# Patient Record
Sex: FEMALE | Race: WHITE | Employment: FULL TIME | URBAN - METROPOLITAN AREA
[De-identification: names, ages, dates, MRNs, and addresses within clinical notes are randomized per-mention and may not be internally consistent; named-entity substitution may affect disease eponyms.]

---

## 2018-11-01 ENCOUNTER — OFFICE VISIT (OUTPATIENT)
Dept: FAMILY MEDICINE CLINIC | Age: 58
End: 2018-11-01

## 2018-11-01 VITALS
WEIGHT: 151 LBS | RESPIRATION RATE: 16 BRPM | OXYGEN SATURATION: 99 % | BODY MASS INDEX: 25.78 KG/M2 | HEIGHT: 64 IN | TEMPERATURE: 96.2 F | SYSTOLIC BLOOD PRESSURE: 125 MMHG | DIASTOLIC BLOOD PRESSURE: 67 MMHG | HEART RATE: 70 BPM

## 2018-11-01 DIAGNOSIS — E04.1 RIGHT THYROID NODULE: Primary | ICD-10-CM

## 2018-11-01 RX ORDER — OMEPRAZOLE 40 MG/1
40 CAPSULE, DELAYED RELEASE ORAL DAILY
COMMUNITY
End: 2019-04-25

## 2018-11-01 NOTE — PROGRESS NOTES
[address] [facility] [address] [place] [address]   Office [phone], Fax [phone]    Subjective:     Chief Complaint   Patient presents with    Mass     neck times one week     History provided by patient     HPI:  Richar Mcfadden is a 59 y.o. PATIENT  female presents for neck mass. Significant history pertinent to presentation includes GERD. Neck lump  - About a week ago, pt felt a lump. +feeling tired for about 2 wks (works full time). Denies recent illness. No sick contacts. Denies unintended weight loss, dysphagia. No hx of thyroid problems or symptoms. Non-smoker. General dry eyes/dry mouth (drinks enough water). No fmhx of SLE/connective tissue disease. Medication reviewed. Allergy reviewed. ROS (bolded are positive):   General Negative for fever, chills, changes in weight, changes in appetite   HEENT Negative for hearing loss, earache, congestion, sore throat, neck lump   CV Negative for chest pain, palpitations, edema   Respiratory Negative for cough, shortness of breath, wheezing   GI Negative for change in bowel habits, abdominal pain, black or bloody stools, nausea or vomiting   Neuro Negative for dizziness, headache, confusion, weakness     Objective:   Vitals - reviewed  Visit Vitals  /67   Pulse 70   Temp 96.2 °F (35.7 °C) (Oral)   Resp 16   Ht 5' 4\" (1.626 m)   Wt 151 lb (68.5 kg)   SpO2 99%   BMI 25.92 kg/m²        Physical exam:   GEN: NAD. Alert. Well nourished. OROPHYARYNX: No oral lesions or exudates. NECK:  Supple; right thyroid enlargement (smooth and homogenous). No LAD. LUNGS: Respirations unlabored; CTAB. no wheeze, rales, rhonchi   CARDIOVASCULAR: Regular, rate, and rhythm without murmurs, gallops or rubs   NEUROLOGIC:  No focal neurologic deficits. Strength and sensation grossly intact. EXT: Well perfused. No edema. No erythema. SKIN: No obvious rashes or lesions.      Pertinent Labs/Studies: n/a    Assessment and orders:       ICD-10-CM ICD-9-CM    1. Right thyroid nodule E04.1 241.0 TSH RFX ON ABNORMAL TO FREE T4      US THYROID/PARATHYROID/SOFT TISS     Diagnoses and all orders for this visit:    1. Right thyroid nodule. Will evaluate with TSH and ultrasound. No symptoms of hypo/hyperthyroidism. No fmhx of cancer or unintended weight loss. Pt given handout on thyroid nodules and re-assured that will be notified when lab results are back.   -     TSH RFX ON ABNORMAL TO FREE T4  -     US THYROID/PARATHYROID/SOFT TISS; Future      Follow-up Disposition:  Return in about 2 weeks (around 11/15/2018), or if symptoms worsen or fail to improve. Pt was discussed with Dr Lakisha Bellamy (attending physician). I have reviewed patient medical and social history and medications. I have reviewed pertinent labs results and other data. I have discussed the diagnosis with the patient and the intended plan as seen in the above orders. The patient has received an after-visit summary and questions were answered concerning future plans. I have discussed medication side effects and warnings with the patient as well.     Davonte Baig MD  Resident [address] [place] [address]  11/01/18

## 2018-11-01 NOTE — PROGRESS NOTES
59year old female new patient to use    Medical Hx:  GERD    Concerned about neck mass    Non-smoker    PE:  ~ 1 cm nodule on right side of thyroid    Plan:  US  Labs  Close followup    I reviewed with the resident the medical history and the resident's findings on the physical examination. I discussed with the resident the patient's diagnosis and concur with the plan.

## 2018-11-01 NOTE — PROGRESS NOTES
Chief Complaint   Patient presents with    Mass     neck times one week     1. Have you been to the ER, urgent care clinic since your last visit? Hospitalized since your last visit? N/A    2. Have you seen or consulted any other health care providers outside of the [address] [place] [address] since your last visit? Include any pap smears or colon screening.  N/A

## 2018-11-01 NOTE — PATIENT INSTRUCTIONS
Thyroid Nodules: Care Instructions  Your Care Instructions  Thyroid nodules are growths or lumps in the thyroid gland. Your thyroid is in the front of your neck. It controls how your body uses energy. You may have tests to see if the nodule is caused by cancer. Most nodules aren't cancer and don't cause problems. Many don't even need treatment. If you do have cancer, it can usually be cured. Treatment will probably include surgery. You may also get radioactive iodine treatment. If your thyroid can't make thyroid hormone after treatment, you can take a pill every day to replace the hormone. Follow-up care is a key part of your treatment and safety. Be sure to make and go to all appointments, and call your doctor if you are having problems. It's also a good idea to know your test results and keep a list of the medicines you take. How can you care for yourself at home? · Be safe with medicines. If you take thyroid hormone medicine:  ? Take it exactly as prescribed. Call your doctor if you think you are having a problem with your medicine. If you take the right amount and don't skip doses, you probably won't have side effects. ? Do not take it with calcium, vitamins, or iron. ? Try not to miss a dose. ? Do not take extra doses. This will not help you get better any faster. It may also cause side effects. ? Tell your doctor about any medicines you take. This includes over-the-counter medicines. ? Wear a medical alert bracelet or necklace that says you take thyroid hormones. You can buy these at most drugstores. When should you call for help? Call 911 anytime you think you may need emergency care. For example, call if:    · You lose consciousness.    Call your doctor now or seek immediate medical care if:    · You have shortness of breath.    Watch closely for changes in your health, and be sure to contact your doctor if:    · You have pain in your neck, jaw, or ear.     · You have problems swallowing.   · You feel weak and tired.     · You have nervousness, a fast heartbeat, hand tremors, problems sleeping, increased sweating, and weight loss.     · You do not feel better even though you are taking your medicine. Where can you learn more? Go to http://melva-tyler.info/. Enter P880 in the search box to learn more about \"Thyroid Nodules: Care Instructions. \"  Current as of: March 15, 2018  Content Version: 11.8  © 2817-5542 [employer]. Care instructions adapted under license by [employer] (which disclaims liability or warranty for this information). If you have questions about a medical condition or this instruction, always ask your healthcare professional. [address] any warranty or liability for your use of this information. Goiter: Care Instructions  Your Care Instructions    A goiter is an enlarged thyroid gland. It can cause swelling in your neck. Your thyroid is found in the front of your neck. It makes a hormone that controls how your body uses energy. Goiters are caused by different things. Some are caused by high or low levels of thyroid hormone. Others are caused by too little iodine in the diet, or a growth or disease in the thyroid. In the [place], most goiters are caused by long-term autoimmune thyroiditis. This is also called Hashimoto's thyroiditis. It happens when the body's immune system damages the thyroid. You may take thyroid hormone to reduce the size of your goiter. Or you may need surgery or radioactive iodine treatment. Some people don't need any treatment. They only need to watch for changes in the goiter. Follow-up care is a key part of your treatment and safety. Be sure to make and go to all appointments, and call your doctor if you are having problems. It's also a good idea to know your test results and keep a list of the medicines you take. How can you care for yourself at home?   · Be safe with medicines. Take your medicines exactly as prescribed. Call your doctor if you think you are having a problem with your medicine. You will get more details on the specific medicines your doctor prescribes. When should you call for help? Call 911 anytime you think you may need emergency care. For example, call if:    · You have trouble breathing.    Watch closely for changes in your health, and be sure to contact your doctor if:    · Your eyes turn red and bulge.     · You have trouble swallowing.     · You feel very tired or weak.     · You lose weight but are eating the same or more than usual.   Where can you learn more? Go to http://melva-tyler.info/. Enter B024 in the search box to learn more about \"Goiter: Care Instructions. \"  Current as of: March 15, 2018  Content Version: 11.8  © 1714-8537 [employer]. Care instructions adapted under license by [employer] (which disclaims liability or warranty for this information). If you have questions about a medical condition or this instruction, always ask your healthcare professional. [address] any warranty or liability for your use of this information.

## 2018-11-02 ENCOUNTER — TELEPHONE (OUTPATIENT)
Dept: FAMILY MEDICINE CLINIC | Age: 58
End: 2018-11-02

## 2018-11-02 LAB — TSH SERPL DL<=0.005 MIU/L-ACNC: 1.79 UIU/ML (ref 0.45–4.5)

## 2018-11-02 NOTE — TELEPHONE ENCOUNTER
----- Message from Marnie Rendon sent at 11/2/2018  1:03 PM EDT -----  Regarding: Ageze/telephone  Pt is requesting her lab results. Pts number is [phone]. Call after 2:00pm.

## 2018-11-08 ENCOUNTER — HOSPITAL ENCOUNTER (OUTPATIENT)
Dept: ULTRASOUND IMAGING | Age: 58
Discharge: HOME | End: 2018-11-08
Attending: STUDENT IN AN ORGANIZED HEALTH CARE EDUCATION/TRAINING PROGRAM
Payer: COMMERCIAL

## 2018-11-08 DIAGNOSIS — E04.1 RIGHT THYROID NODULE: ICD-10-CM

## 2018-11-08 PROCEDURE — 76536 US EXAM OF HEAD AND NECK: CPT

## 2018-11-12 ENCOUNTER — TELEPHONE (OUTPATIENT)
Dept: FAMILY MEDICINE CLINIC | Age: 58
End: 2018-11-12

## 2018-11-12 NOTE — TELEPHONE ENCOUNTER
Patient would like to be contacted with results from thyroid ultrasound .       Please call [phone]    thanks

## 2018-11-13 DIAGNOSIS — E04.1 RIGHT THYROID NODULE: Primary | ICD-10-CM

## 2018-11-13 NOTE — TELEPHONE ENCOUNTER
Dr Welch/Telephone   Received: Yesterday 6:24PM 11/12/18  Message Contents   Pia DAVIS [facility] Front Office             Pt is following up on results of Ultra Sound of thyroid due to lump, done at [facility] on 11/08/18, she was told that the results were in, this is her second call.      Best contact # [phone]

## 2018-11-14 ENCOUNTER — TELEPHONE (OUTPATIENT)
Dept: FAMILY MEDICINE CLINIC | Age: 58
End: 2018-11-14

## 2018-11-14 NOTE — TELEPHONE ENCOUNTER
Patient called to inform of appt    ENDO/ Dr. Ralph Laureano  1/11/19 at 2:45 pm    Office ph [phone]    Fax [phone]    Office notes/us results/labs needed

## 2019-01-10 ENCOUNTER — TELEPHONE (OUTPATIENT)
Dept: FAMILY MEDICINE CLINIC | Age: 59
End: 2019-01-10

## 2019-01-10 NOTE — TELEPHONE ENCOUNTER
----- Message from Kushal Currie sent at 1/10/2019  9:11 AM EST -----  Regarding: Dr. Eduardo/ telephone   Pt is requesting that ultrasound on thyroid done 2 months ago be sent to Dr. Josué Khoury at [place] [address] office fax [phone].  Pt states that appt is 1/11/19 tomorrow at 3pm. Best contact [phone]

## 2019-01-10 NOTE — TELEPHONE ENCOUNTER
----- Message from Nova Foreman sent at 1/10/2019  9:11 AM EST -----  Regarding: Dr. Eduardo/ telephone   Pt is requesting that ultrasound on thyroid done 2 months ago be sent to Dr. Siomara Cabral at [place] [address] office fax [phone].  Pt states that appt is 1/11/19 tomorrow at 3pm. Best contact [phone]

## 2019-01-12 LAB — CREATININE, EXTERNAL: 0.58

## 2019-02-28 ENCOUNTER — OFFICE VISIT (OUTPATIENT)
Dept: FAMILY MEDICINE CLINIC | Age: 59
End: 2019-02-28

## 2019-02-28 VITALS
WEIGHT: 153 LBS | RESPIRATION RATE: 18 BRPM | HEART RATE: 73 BPM | BODY MASS INDEX: 26.12 KG/M2 | TEMPERATURE: 98.3 F | OXYGEN SATURATION: 98 % | SYSTOLIC BLOOD PRESSURE: 108 MMHG | DIASTOLIC BLOOD PRESSURE: 72 MMHG | HEIGHT: 64 IN

## 2019-02-28 DIAGNOSIS — R23.8 CHANGE OF SKIN COLOR: Primary | ICD-10-CM

## 2019-02-28 NOTE — PROGRESS NOTES
Has been seen by endocrinology and dermatology    Here for area of skin discoloration on her neck    Recently seen by endocrinology for thyroid nodule (found to be too small for biopsy)    PE:  Neck -- slight discoloration of left side of neck, no underlying structures palpated    Recommend observation     F/U if area changes    I reviewed with the resident the medical history and the resident's findings on the physical examination. I discussed with the resident the patient's diagnosis and concur with the plan.

## 2019-02-28 NOTE — PROGRESS NOTES
Chief Complaint   Patient presents with    Skin Discoloration     on neck X 1 month; no pain or itch

## 2019-02-28 NOTE — PROGRESS NOTES
Iglesia Jones is an 59 y.o. female who presents for skin discoloration. Pt shares that 6 wks ago her daughter noted discoloration of the anterior left neck base. Area looked like a bruise per pt. No injury or trauma. No tenderness. No headaches, dizziness, swelling. No change over last 6 weeks. No treatment attempted. Pt shares she was referred to endocrinology, Dr. Denise Hardy MD, for thyroid nodule. Per pt endo scanned thyroid and told her everything ok. He also sent pt for cardiac calcium score, pt notes it was nml. Labs also done, pt recalls her cholesterol was high, attempting lifestyle modification. Following up with endo in 12/2019. Pt followed by dermatology, Dr. Orlando Lal. Last time seen 2 months ago. Allergies - reviewed:   No Known Allergies      Medications - reviewed:   Current Outpatient Medications   Medication Sig    omeprazole (PRILOSEC) 40 mg capsule Take 40 mg by mouth daily. No current facility-administered medications for this visit.         Social History - reviewed:  Social History     Socioeconomic History    Marital status:      Spouse name: Not on file    Number of children: Not on file    Years of education: Not on file    Highest education level: Not on file   Social Needs    Financial resource strain: Not on file    Food insecurity - worry: Not on file    Food insecurity - inability: Not on file   [employer] needs - medical: Not on file   [employer] needs - non-medical: Not on file   Occupational History    Not on file   Tobacco Use    Smoking status: Never Smoker   Substance and Sexual Activity    Alcohol use: No    Drug use: Not on file    Sexual activity: Not on file   Other Topics Concern    Not on file   Social History Narrative    Not on file       ROS (bolded are positive):   General Negative for fever, chills, changes in weight, changes in appetite   HEENT Negative for hearing loss, earache, congestion, sore throat CV Negative for chest pain, palpitations, edema   Respiratory Negative for cough, shortness of breath, wheezing   GI Negative for change in bowel habits, abdominal pain, black or bloody stools, nausea or vomiting   Neuro Negative for dizziness, headache, confusion, weakness       Physical Exam  Visit Vitals  /72   Pulse 73   Temp 98.3 °F (36.8 °C)   Resp 18   Ht 5' 4\" (1.626 m)   Wt 153 lb (69.4 kg)   SpO2 98%   BMI 26.26 kg/m²       GEN: NAD. Alert. Well nourished. OROPHYARYNX: No oral lesions or exudates. NECK:  Supple; Right thyroid enlargement (smooth and homogenous, unchanged    from last exam). No LAD. slight discoloration of the anterior left neck base supraclavicular  area. Measuring 3 cmx2 cm. No underlying structures noted. LUNGS: Respirations unlabored; CTAB. no wheeze, rales, rhonchi   CARDIOVASCULAR: Regular, rate, and rhythm without murmurs, gallops or rubs   NEUROLOGIC:  No focal neurologic deficits. Strength and sensation grossly intact. EXT: Well perfused. No edema. No erythema. Assessment/Plan    ICD-10-CM ICD-9-CM    1. Change of skin color R23.8 782.9      -Unremarkable exam today. Will monitor, pt aware if notices any changes she should be evaluated. -Will obtain labs from endo and review. Will order labs after reviewing.  -pt seen by attending. Follow-up Disposition:  Return if symptoms worsen or fail to improve. I have discussed the diagnosis with the patient and the intended plan as seen in the above orders. Patient verbalized understanding of the plan and agrees with the plan. The patient has received an after-visit summary and questions were answered concerning future plans. Informed patient to return to the office if new symptoms arise.         Lanette Shelley DO  Family Medicine Resident

## 2019-03-20 ENCOUNTER — TELEPHONE (OUTPATIENT)
Dept: FAMILY MEDICINE CLINIC | Age: 59
End: 2019-03-20

## 2019-03-20 NOTE — TELEPHONE ENCOUNTER
----- Message from Angela Reyes sent at 3/20/2019  1:50 PM EDT -----  Regarding:  Dr. Bernard Cruz Pt is requesting a call back regarding scheduling an apt with a Sports Medicine Dr for an injection in her L arm and shoulder due to pain. Best contact is [phone].

## 2019-03-21 ENCOUNTER — OFFICE VISIT (OUTPATIENT)
Dept: FAMILY MEDICINE CLINIC | Age: 59
End: 2019-03-21

## 2019-03-21 VITALS
HEIGHT: 64 IN | TEMPERATURE: 97.9 F | RESPIRATION RATE: 16 BRPM | HEART RATE: 71 BPM | BODY MASS INDEX: 26.98 KG/M2 | WEIGHT: 158 LBS | OXYGEN SATURATION: 99 % | DIASTOLIC BLOOD PRESSURE: 78 MMHG | SYSTOLIC BLOOD PRESSURE: 125 MMHG

## 2019-03-21 DIAGNOSIS — M75.42 IMPINGEMENT SYNDROME OF LEFT SHOULDER: Primary | ICD-10-CM

## 2019-03-21 DIAGNOSIS — M19.012 ARTHRITIS OF LEFT ACROMIOCLAVICULAR JOINT: ICD-10-CM

## 2019-03-21 NOTE — PROGRESS NOTES
History of Present Illness     Patient Identification  Anel Jones is a 59 y.o. female complains of pain in the left shoulder pain. Symptoms present for 1 year. Has been aching. Worsened by yoga moves. Does stretch and balance classes/yoga 3x/week. No shoulder injuries or history of shoulder problems. Not taking any medications. Was seen about a month ago by PCP, recommended conservative threapies and follow up with sports medicine if symptoms persisted. Sits at desk and looks down at a computer monitor on most days. Does not smoke    History reviewed. No pertinent past medical history. No family history on file. Current Outpatient Medications   Medication Sig Dispense Refill    omeprazole (PRILOSEC) 40 mg capsule Take 40 mg by mouth daily. No Known Allergies    Review of Systems  Pertinent items are noted in HPI. Physical Exam     Visit Vitals  /78 (BP 1 Location: Right arm, BP Patient Position: Sitting)   Pulse 71   Temp 97.9 °F (36.6 °C) (Oral)   Resp 16   Ht 5' 4\" (1.626 m)   Wt 158 lb (71.7 kg)   SpO2 99%   BMI 27.12 kg/m²       GEN: Well appearing. No apparent distress. Responds to all questions appropriately. Lungs: No labored respirations. Talking in complete sentences without difficulty.   Shoulder: left  Deformity: None    ROM:  Forward Flexion: Active: 180   Passive: 180  ER (0): Active: 30   Passive: 30  IR (0): Active: Behind the body to the level T4  Abduction: Active: 180   Passive: 180    Palpation:  AC tenderness: None  SC tenderness: None  Clavicle tenderness: None  Biceps tenderness: +tenderness    Strength (0-5/5):  Deltoid - Anterior: 5/5  Deltoid - Posterior: 5/5  Deltoid - Mid: 5/5  Supraspinatus: 5/5  External rotation: 5/5  Internal rotation: 5/5    Rotator Cuff Exam:  Neers sign: positive  Segura sign: positive  Painful Arc: Negative  Lift-off sign / Belly Press: Negative    Biceps/Labrum/AC Exam:  Yergasons Test: Negative  Speeds Test: Negative  OMarvs Sign: Negative  Cross-chest adduction: Negative    Neuro/Vascular:  Pulses intact, no edema, and neurologically intact    C-Spine:  Cervical motion: FROM without pain. Cervical tenderness: None    Skin: No obvious rash or skin breakdown. Imaging: Radiographs of the left shoulder personally reviewed and demonstrates no obvious fracture or dislocation. Degenerative changes in [facility] joint. Mild degenerative changes in glenohumeral joint. Assessment:    ICD-10-CM ICD-9-CM    1. Impingement syndrome of left shoulder M75.42 726.2    2. Arthritis of left acromioclavicular joint M19.012 716.91 XR SHOULDER LT AP/LAT MIN 2 V       Plan:  1. Home Exercise Program as per handout. Discussed PT but she would like to try HEP first.   2. Ice 15 minutes, three times a day PRN and after exercise. Can alternate with heat for 15 minutes. 3.  Return if worsening for shoulder injection. Medications:    1. Naproxin (Aleve): 220mg 1-2 tablets twice a day PRN. 2. Acetaminophen (Tylenol):  500mg 1-2 tablets every 6 hours as needed for pain.     RTC: prn for injection

## 2019-03-21 NOTE — PROGRESS NOTES
Identified Patient with two Patient identifiers (Name and ). Two Patient Identifiers confirmed. Reviewed record in preparation for visit and have obtained necessary documentation. Chief Complaint   Patient presents with    Arm Pain     Left Arm Andre x \"several\" months - pain begin Left Shoulder Raidating Down arm. Hindering Exercise and c/o paoi  whenturning steering wheel        Visit Vitals  /78 (BP 1 Location: Right arm, BP Patient Position: Sitting)   Pulse 71   Temp 97.9 °F (36.6 °C) (Oral)   Resp 16   Ht 5' 4\" (1.626 m)   Wt 158 lb (71.7 kg)   SpO2 99%   BMI 27.12 kg/m²       1. Have you been to the ER, urgent care clinic since your last visit? Hospitalized since your last visit? No    2. Have you seen or consulted any other health care providers outside of the [address] [place] [address] since your last visit? Include any pap smears or colon screening.  No

## 2019-04-25 ENCOUNTER — OFFICE VISIT (OUTPATIENT)
Dept: FAMILY MEDICINE CLINIC | Age: 59
End: 2019-04-25

## 2019-04-25 VITALS
OXYGEN SATURATION: 97 % | TEMPERATURE: 98.2 F | RESPIRATION RATE: 20 BRPM | BODY MASS INDEX: 27.29 KG/M2 | WEIGHT: 159 LBS | SYSTOLIC BLOOD PRESSURE: 108 MMHG | DIASTOLIC BLOOD PRESSURE: 70 MMHG | HEART RATE: 71 BPM

## 2019-04-25 DIAGNOSIS — R14.0 ABDOMINAL BLOATING: ICD-10-CM

## 2019-04-25 DIAGNOSIS — R19.8 ALTERNATING CONSTIPATION AND DIARRHEA: ICD-10-CM

## 2019-04-25 DIAGNOSIS — R10.84 GENERALIZED ABDOMINAL PAIN: Primary | ICD-10-CM

## 2019-04-25 NOTE — PROGRESS NOTES
Chief Complaint   Patient presents with   [facility]     Patient complains of bouts of constipation and diarrhea with epigastric and lower abdominal pain. Intermittent problems empyting stool which appears thin in form. Patient states she has a hx of GERDS but is no longer taking Prilosec due to it's ineffectiveness. 1. Have you been to the ER, urgent care clinic since your last visit? Hospitalized since your last visit? No    2. Have you seen or consulted any other health care providers outside of the [address] [place] [address] since your last visit? Include any pap smears or colon screening.  No    Visit Vitals  /70 (BP 1 Location: Left arm, BP Patient Position: Sitting)   Pulse 71   Temp 98.2 °F (36.8 °C) (Oral)   Resp 20   Wt 159 lb (72.1 kg)   SpO2 97%   BMI 27.29 kg/m²

## 2019-04-25 NOTE — PROGRESS NOTES
Iliana Santana  62 y.o. female  1960  [address]  [id number]   [address]: Progress Note  Christiana Rashid MD       Encounter Date: 4/25/2019    Chief Complaint   Patient presents with   ALONZO VILLAL[facility]     Patient complains of bouts of constipation and diarrhea with epigastric and lower abdominal pain. Intermittent problems empyting stool which appears thin in form. History of Present Illness   Noah Curran is a 62 y.o. female who presents to clinic today for approximately 1 year abdominal pain and bloating. She recalls seeing her GYN in the fall and her symptoms were present at that time. Describes her feeling bloated and gassy. States she typically has 2 cups of coffee in the morning and allows her to have a BM at that time. Is noticed that she is starting to have more frequent BMs, sometimes in the early afternoon. She describes the stools as \"thin stools\" or diarrhea. This will alternate with constipation and she will sometimes she feels like she needs to go, but cannot. Notes a history of epigastric discomfort for which her GYN prescribed her medication \"to kill a bacteria. \"  Also notes early satiety and occasional nausea. Denies weight loss, hematochezia or melena. No family hx. Normal diet . No changes in lifestyle. No temp insensitivity  No change in weight  Dry skin and Dry eyes  Had thyroid nodule evaluated by endocrinology within the past 6 months, with negative findings. TSH was normal at that time. Last menses 10 years. No vaginal bleeding. Does drink [place], multiple glasses a night. She states Mikaken Tate knows needs to cut back. \"  Rare NSAIDS.     Was on omeprazole for 7 years for laryngopharyngeal reflux, off for 1 month    Colonoscopy last year (2 benign polyps, return in 3 years  Endoscopy (2 polyps seen here too)  Dr. Sherice Hope office    Review of Systems   General: Denies fevers, chills, confusion  HEENT: Denies congestion, rhinorrhea, sore throat, ear pain  Cardiac: Denies chest pain, palpitations, dyspnea on exertion  Pulmonary: Denies shortness of breath, wheezing, cough  Abdominal: Abdominal pain and bloating, occasional nausea. Denies vomiting. Intermittent alternating diarrhea and constipation. : Denies dysuria, hematuria. Denies vaginal bleeding. MSK: Denies musculoskeletal or joint pain  Skin: Denies rash  Psych: Denies depression or anxiety  Vitals/Objective:     Vitals:    04/25/19 1918   BP: 108/70   Pulse: 71   Resp: 20   Temp: 98.2 °F (36.8 °C)   TempSrc: Oral   SpO2: 97%   Weight: 159 lb (72.1 kg)     Body mass index is 27.29 kg/m². General: Patient alert and oriented and in NAD  HEENT: PER/EOMI, no conjunctival pallor or scleral icterus. No thyromegaly or cervical lymphadenopathy  Heart: Regular rate and rhythm, No murmurs, rubs or gallops. 2+ peripheral pulses  Lungs: Clear to auscultation bilaterally, no wheezing, rales or rhonchi  Abd: +BS, non-tender, non-distended  Ext: No edema  Skin: No rashes or lesions noted on exposed skin,  Psych: Appropriate mood and affect    Assessment and Plan:   1. Generalized abdominal pain  2. Abdominal bloating  3. Alternating constipation and diarrhea  Etiology unclear. Ventral diagnosis includes infectious diarrhea, gastritis/esophagitis, PUD (rule out H. pylori), C. difficile (while unlikely based upon the description of the patient's stool, patient was on PPIs for approximately 7 years). May need additional imaging of her abdomen. Recommend patient start a probiotic. Recommend patient to cut back her drinking.  - CULTURE, STOOL  - C DIFFICILE TOXIN A & B BY EIA  - OVA & PARASITES, STOOL  - H PYLORI AG, STOOL      I have discussed the diagnosis with the patient and the intended plan as seen in the above orders. she has expressed understanding. The patient has received an after-visit summary and questions were answered concerning future plans. I have discussed medication side effects and warnings with the patient as well. Follow-up and Dispositions  ·   Return in about 2 weeks (around 5/9/2019) for Follow-up on abdominal pain. Electronically Signed: Manan Gutierrez MD     History   Patients past medical, surgical and family histories were reviewed and updated. History reviewed. No pertinent past medical history. History reviewed. No pertinent surgical history. History reviewed. No pertinent family history.   Social History     Socioeconomic History    Marital status:      Spouse name: Not on file    Number of children: Not on file    Years of education: Not on file    Highest education level: Not on file   Occupational History    Not on file   Social Needs    Financial resource strain: Not on file    Food insecurity:     Worry: Not on file     Inability: Not on file    Transportation needs:     Medical: Not on file     Non-medical: Not on file   Tobacco Use    Smoking status: Never Smoker    Smokeless tobacco: Never Used   Substance and Sexual Activity    Alcohol use: No    Drug use: Never    Sexual activity: Yes   Lifestyle    Physical activity:     Days per week: Not on file     Minutes per session: Not on file    Stress: Not on file   Relationships    Social connections:     Talks on phone: Not on file     Gets together: Not on file     Attends Gnosticism service: Not on file     Active member of club or organization: Not on file     Attends meetings of clubs or organizations: Not on file     Relationship status: Not on file    Intimate partner violence:     Fear of current or ex partner: Not on file     Emotionally abused: Not on file     Physically abused: Not on file     Forced sexual activity: Not on file   Other Topics Concern    Not on file   Social History Narrative    Not on file            Current Medications/Allergies     No current medications    No Known Allergies

## 2019-04-26 NOTE — PATIENT INSTRUCTIONS
· Cut back on your alcohol  · Come back with stool sample  · Use probiotic       Abdominal Pain: Care Instructions  Your Care Instructions    Abdominal pain has many possible causes. Some aren't serious and get better on their own in a few days. Others need more testing and treatment. If your pain continues or gets worse, you need to be rechecked and may need more tests to find out what is wrong. You may need surgery to correct the problem. Don't ignore new symptoms, such as fever, nausea and vomiting, urination problems, pain that gets worse, and dizziness. These may be signs of a more serious problem. Your doctor may have recommended a follow-up visit in the next 8 to 12 hours. If you are not getting better, you may need more tests or treatment. The doctor has checked you carefully, but problems can develop later. If you notice any problems or new symptoms, get medical treatment right away. Follow-up care is a key part of your treatment and safety. Be sure to make and go to all appointments, and call your doctor if you are having problems. It's also a good idea to know your test results and keep a list of the medicines you take. How can you care for yourself at home? · Rest until you feel better. · To prevent dehydration, drink plenty of fluids, enough so that your urine is light yellow or clear like water. Choose water and other caffeine-free clear liquids until you feel better. If you have kidney, heart, or liver disease and have to limit fluids, talk with your doctor before you increase the amount of fluids you drink. · If your stomach is upset, eat mild foods, such as rice, dry toast or crackers, bananas, and applesauce. Try eating several small meals instead of two or three large ones. · Wait until 48 hours after all symptoms have gone away before you have spicy foods, alcohol, and drinks that contain caffeine. · Do not eat foods that are high in fat.   · Avoid anti-inflammatory medicines such as aspirin, ibuprofen (Advil, Motrin), and naproxen (Aleve). These can cause stomach upset. Talk to your doctor if you take daily aspirin for another health problem. When should you call for help? Call 911 anytime you think you may need emergency care. For example, call if:    · You passed out (lost consciousness).     · You pass maroon or very bloody stools.     · You vomit blood or what looks like coffee grounds.     · You have new, severe belly pain.    Call your doctor now or seek immediate medical care if:    · Your pain gets worse, especially if it becomes focused in one area of your belly.     · You have a new or higher fever.     · Your stools are black and look like tar, or they have streaks of blood.     · You have unexpected vaginal bleeding.     · You have symptoms of a urinary tract infection. These may include:  ? Pain when you urinate. ? Urinating more often than usual.  ? Blood in your urine.     · You are dizzy or lightheaded, or you feel like you may faint.    Watch closely for changes in your health, and be sure to contact your doctor if:    · You are not getting better after 1 day (24 hours). Where can you learn more? Go to http://melva-tyler.info/. Enter P203 in the search box to learn more about \"Abdominal Pain: Care Instructions. \"  Current as of: September 23, 2018  Content Version: 11.9  © 3454-1821 [employer]. Care instructions adapted under license by [employer] (which disclaims liability or warranty for this information). If you have questions about a medical condition or this instruction, always ask your healthcare professional. [place] [address] any warranty or liability for your use of this information. Gas and Bloating: Care Instructions  Your Care Instructions    Gas and bloating can be uncomfortable and embarrassing problems.  All people pass gas, but some people produce more gas than others, sometimes enough to cause distress. It is normal to pass gas from 6 to 20 times per day. Excess gas usually is not caused by a serious health problem. Gas and bloating usually are caused by something you eat or drink, including some food supplements and medicines. Gas and bloating are usually harmless and go away without treatment. However, changing your diet can help end the problem. Some over-the-counter medicines can help prevent gas and relieve bloating. Follow-up care is a key part of your treatment and safety. Be sure to make and go to all appointments, and call your doctor if you are having problems. It's also a good idea to know your test results and keep a list of the medicines you take. How can you care for yourself at home? · Keep a food diary if you think a food gives you gas. Write down what you eat or drink. Also record when you get gas. If you notice that a food seems to cause your gas each time, avoid it and see if the gas goes away. Examples of foods that cause gas include:  ? Fried and fatty foods. ? Beans. ? Vegetables such as artichokes, asparagus, broccoli, brussels sprouts, cabbage, cauliflower, cucumbers, green peppers, onions, peas, radishes, and raw potatoes. ? Fruits such as apricots, bananas, melons, peaches, pears, prunes, and raw apples. ? Wheat and wheat bran. · Soak dry beans in water overnight, then dump the water and cook the soaked beans in new water. This can help prevent gas and bloating. · If you have problems with lactose, avoid dairy products such as milk and cheese. · Try not to swallow air. Do not drink through a straw, gulp your food, or chew gum. · Take an over-the-counter medicine. Read and follow all instructions on the label. ? Food enzymes, such as Beano, can be added to gas-producing foods to prevent gas. ? Antacids, such as Maalox Anti-Gas and Mylanta Gas, can relieve bloating by making you burp. Be careful when you take over-the-counter antacid medicines.  Many of these medicines have aspirin in them. Read the label to make sure that you are not taking more than the recommended dose. Too much aspirin can be harmful. ? Activated charcoal tablets, such as CharcoCaps, may decrease odor from gas you pass. ? If you have problems with lactose, you can take medicines such as Dairy Ease and Lactaid with dairy products to prevent gas and bloating. · Get some exercise regularly. When should you call for help? Call 911 anytime you think you may need emergency care. For example, call if:    · You have gas and signs of a heart attack, such as:  ? Chest pain or pressure. ? Sweating. ? Shortness of breath. ? Nausea or vomiting. ? Pain that spreads from the chest to the neck, jaw, or one or both shoulders or arms. ? Dizziness or lightheadedness. ? A fast or uneven pulse. After calling 911, chew 1 adult-strength aspirin. Wait for an ambulance. Do not try to drive yourself.    Call your doctor now or seek immediate medical care if:    · You have severe belly pain.     · You have blood in your stool.    Watch closely for changes in your health, and be sure to contact your doctor if:    · You have blood or pus in your urine.     · Your urine is cloudy or smells bad.     · You are burping and have trouble swallowing.     · You feel bloated and have swelling in your belly.     · You do not get better as expected. Where can you learn more? Go to http://melva-tyler.info/. Enter B860 in the search box to learn more about \"Gas and Bloating: Care Instructions. \"  Current as of: September 23, 2018  Content Version: 11.9  © 4057-2328 [employer]. Care instructions adapted under license by [employer] (which disclaims liability or warranty for this information).  If you have questions about a medical condition or this instruction, always ask your healthcare professional. [address] any warranty or liability for your use of this information.

## 2019-05-01 LAB
C DIFF TOX A+B STL QL IA: NEGATIVE
CAMPYLOBACTER STL CULT: NORMAL
E COLI SXT STL QL IA: NEGATIVE
H PYLORI AG STL QL IA: NEGATIVE
O+P SPEC MICRO: NORMAL
SALM + SHIG STL CULT: NORMAL

## 2019-05-06 ENCOUNTER — TELEPHONE (OUTPATIENT)
Dept: FAMILY MEDICINE CLINIC | Age: 59
End: 2019-05-06

## 2019-05-06 NOTE — TELEPHONE ENCOUNTER
----- Message from Flako Saavedra sent at 5/3/2019  4:58 PM EDT -----  Regarding: /Telephone  Pt requesting a call in reference to her stool sample results. Best contact [phone].

## 2019-05-06 NOTE — TELEPHONE ENCOUNTER
Returned the call and left message that  Dr. Rozina De La Rosa had mailed out letter on 5/2. If she had other questions, to call the office.

## 2019-05-31 ENCOUNTER — OFFICE VISIT (OUTPATIENT)
Dept: FAMILY MEDICINE CLINIC | Age: 59
End: 2019-05-31

## 2019-05-31 VITALS
HEIGHT: 64 IN | SYSTOLIC BLOOD PRESSURE: 110 MMHG | TEMPERATURE: 97.8 F | OXYGEN SATURATION: 99 % | HEART RATE: 67 BPM | BODY MASS INDEX: 26.98 KG/M2 | DIASTOLIC BLOOD PRESSURE: 69 MMHG | WEIGHT: 158 LBS

## 2019-05-31 DIAGNOSIS — L25.9 CONTACT DERMATITIS, UNSPECIFIED CONTACT DERMATITIS TYPE, UNSPECIFIED TRIGGER: Primary | ICD-10-CM

## 2019-05-31 NOTE — PATIENT INSTRUCTIONS

## 2019-05-31 NOTE — PROGRESS NOTES
Subjective:      Jose Truong is a 62 y.o. female who presents for rash. She has a rash on her left ring finger, nose and neck. Reports that it started last night on her finger and then this morning noticed it on her neck and nose. Has has some itching on her finger. Has tried Neospoin. Denies any new soaps, detergenets or medications. Review of Systems   Constitutional: Negative for chills and fever. Gastrointestinal: Negative for nausea and vomiting. Skin: Positive for itching and rash. PMHx:  No past medical history on file. Meds:       Allergies:   No Known Allergies    Smoker:  Social History     Tobacco Use   Smoking Status Never Smoker   Smokeless Tobacco Never Used       ETOH:   Social History     Substance and Sexual Activity   Alcohol Use No       FH: No family history on file. Objective:     Visit Vitals  /69   Pulse 67   Temp 97.8 °F (36.6 °C)   Ht 5' 4\" (1.626 m)   Wt 158 lb (71.7 kg)   SpO2 99%   BMI 27.12 kg/m²       Physical Examination:   GEN: No apparent distress. Alert and oriented and responds to all questions appropriately. LUNGS: Respirations unlabored; clear to auscultation bilaterally  CARDIOVASCULAR: Regular, rate, and rhythm without murmurs, gallops or rubs  SKIN:   Erythematous linear vesicular rash on left middle finger. Erythematous macular rash on nose and neck. Assessment:       ICD-10-CM ICD-9-CM    1. Contact dermatitis, unspecified contact dermatitis type, unspecified trigger L25.9 692.9          Plan:     Likely contact dermatitis   Start Hydrocortisone cream BID  Claritin prn for itching   Follow up as needed   Precautions given     Patient is counseled to return to the office if symptoms do not improve as expected. Urgent consultation with the nearest Emergency Department is strongly recommended if condition worsens.   Patient is counseled to follow up as recommended and to inform the office if any changes in treatment are recommended.             Signed By:  Terrence Jc MD    Family Medicine Resident

## 2019-09-25 ENCOUNTER — APPOINTMENT (OUTPATIENT)
Dept: GENERAL RADIOLOGY | Age: 59
End: 2019-09-25
Attending: EMERGENCY MEDICINE
Payer: COMMERCIAL

## 2019-09-25 ENCOUNTER — HOSPITAL ENCOUNTER (EMERGENCY)
Age: 59
Discharge: HOME | End: 2019-09-25
Attending: EMERGENCY MEDICINE
Payer: COMMERCIAL

## 2019-09-25 VITALS
SYSTOLIC BLOOD PRESSURE: 133 MMHG | TEMPERATURE: 98.6 F | BODY MASS INDEX: 26.98 KG/M2 | HEIGHT: 64 IN | DIASTOLIC BLOOD PRESSURE: 92 MMHG | RESPIRATION RATE: 20 BRPM | OXYGEN SATURATION: 98 % | WEIGHT: 158 LBS | HEART RATE: 77 BPM

## 2019-09-25 DIAGNOSIS — M54.2 ACUTE NECK PAIN: Primary | ICD-10-CM

## 2019-09-25 LAB
ANION GAP SERPL CALC-SCNC: 5 MMOL/L (ref 5–15)
ATRIAL RATE: 67 BPM
BASOPHILS # BLD: 0.1 K/UL (ref 0–0.1)
BASOPHILS NFR BLD: 1 % (ref 0–1)
BUN SERPL-MCNC: 17 MG/DL (ref 6–20)
BUN/CREAT SERPL: 24 (ref 12–20)
CALCIUM SERPL-MCNC: 9.6 MG/DL (ref 8.5–10.1)
CALCULATED P AXIS, ECG09: 21 DEGREES
CALCULATED R AXIS, ECG10: -24 DEGREES
CALCULATED T AXIS, ECG11: 0 DEGREES
CHLORIDE SERPL-SCNC: 103 MMOL/L (ref 97–108)
CO2 SERPL-SCNC: 30 MMOL/L (ref 21–32)
COMMENT, HOLDF: NORMAL
CREAT SERPL-MCNC: 0.7 MG/DL (ref 0.55–1.02)
DIAGNOSIS, 93000: NORMAL
DIFFERENTIAL METHOD BLD: NORMAL
EOSINOPHIL # BLD: 0 K/UL (ref 0–0.4)
EOSINOPHIL NFR BLD: 1 % (ref 0–7)
ERYTHROCYTE [DISTWIDTH] IN BLOOD BY AUTOMATED COUNT: 11.9 % (ref 11.5–14.5)
GLUCOSE SERPL-MCNC: 95 MG/DL (ref 65–100)
HCT VFR BLD AUTO: 42.7 % (ref 35–47)
HGB BLD-MCNC: 14.2 G/DL (ref 11.5–16)
IMM GRANULOCYTES # BLD AUTO: 0 K/UL (ref 0–0.04)
IMM GRANULOCYTES NFR BLD AUTO: 0 % (ref 0–0.5)
LYMPHOCYTES # BLD: 1.5 K/UL (ref 0.8–3.5)
LYMPHOCYTES NFR BLD: 33 % (ref 12–49)
MCH RBC QN AUTO: 30.5 PG (ref 26–34)
MCHC RBC AUTO-ENTMCNC: 33.3 G/DL (ref 30–36.5)
MCV RBC AUTO: 91.8 FL (ref 80–99)
MONOCYTES # BLD: 0.4 K/UL (ref 0–1)
MONOCYTES NFR BLD: 8 % (ref 5–13)
NEUTS SEG # BLD: 2.6 K/UL (ref 1.8–8)
NEUTS SEG NFR BLD: 57 % (ref 32–75)
NRBC # BLD: 0 K/UL (ref 0–0.01)
NRBC BLD-RTO: 0 PER 100 WBC
P-R INTERVAL, ECG05: 140 MS
PLATELET # BLD AUTO: 308 K/UL (ref 150–400)
PMV BLD AUTO: 9 FL (ref 8.9–12.9)
POTASSIUM SERPL-SCNC: 3.9 MMOL/L (ref 3.5–5.1)
Q-T INTERVAL, ECG07: 398 MS
QRS DURATION, ECG06: 80 MS
QTC CALCULATION (BEZET), ECG08: 420 MS
RBC # BLD AUTO: 4.65 M/UL (ref 3.8–5.2)
SAMPLES BEING HELD,HOLD: NORMAL
SODIUM SERPL-SCNC: 138 MMOL/L (ref 136–145)
TROPONIN I BLD-MCNC: <0.04 NG/ML (ref 0–0.08)
TROPONIN I SERPL-MCNC: <0.05 NG/ML
VENTRICULAR RATE, ECG03: 67 BPM
WBC # BLD AUTO: 4.6 K/UL (ref 3.6–11)

## 2019-09-25 PROCEDURE — 36415 COLL VENOUS BLD VENIPUNCTURE: CPT

## 2019-09-25 PROCEDURE — 99284 EMERGENCY DEPT VISIT MOD MDM: CPT

## 2019-09-25 PROCEDURE — 74011250637 HC RX REV CODE- 250/637: Performed by: EMERGENCY MEDICINE

## 2019-09-25 PROCEDURE — 93005 ELECTROCARDIOGRAM TRACING: CPT

## 2019-09-25 PROCEDURE — 84484 ASSAY OF TROPONIN QUANT: CPT

## 2019-09-25 PROCEDURE — 85025 COMPLETE CBC W/AUTO DIFF WBC: CPT

## 2019-09-25 PROCEDURE — 80048 BASIC METABOLIC PNL TOTAL CA: CPT

## 2019-09-25 RX ORDER — IBUPROFEN 600 MG/1
600 TABLET ORAL
Status: COMPLETED | OUTPATIENT
Start: 2019-09-25 | End: 2019-09-25

## 2019-09-25 RX ADMIN — IBUPROFEN 600 MG: 600 TABLET, FILM COATED ORAL at 11:43

## 2019-09-25 NOTE — ED PROVIDER NOTES
I have evaluated the patient as the Provider in Triage. I have reviewed Her vital signs and the triage nurse assessment. I have talked with the patient and any available family and advised that I am the provider in triage and have ordered the appropriate study to initiate their work up based on the clinical presentation during my assessment. I have advised that the patient will be accommodated in the Main ED as soon as possible. I have also requested to contact the triage nurse or myself immediately if the patient experiences any changes in their condition during this brief waiting period. 62 y.o. female with no significant past medical history who presents from home with chief complaint of back pain. Pt complains of left sided upper back pain that started while she was working from home around 10:15 AM. Pt notes she had an episode of nausea that resolved quickly. Pt states she was evaluated by EMS at her home and told to come to the ED. Pt states she gets intermittent back pain from working on her computer daily. Pt notes intermittent chest pain at night that resolved about 1 month ago. Pt denies diaphoresis, shortness of breath, or chest pain. There are no other acute medical concerns at this time. Social hx: Never Smoker. Denies EtOH Use. PCP: Javier Schwarz MD    Note written by Sherice Mata, as dictated by Antwon Wong, DO 11:37 AM        The history is provided by the patient and medical records. No past medical history on file. No past surgical history on file. No family history on file.     Social History     Socioeconomic History    Marital status:      Spouse name: Not on file    Number of children: Not on file    Years of education: Not on file    Highest education level: Not on file   Occupational History    Not on file   Social Needs    Financial resource strain: Not on file    Food insecurity:     Worry: Not on file     Inability: Not on file   [address] [facility] Transportation needs:     Medical: Not on file     Non-medical: Not on file   Tobacco Use    Smoking status: Never Smoker    Smokeless tobacco: Never Used   Substance and Sexual Activity    Alcohol use: No    Drug use: Never    Sexual activity: Yes   Lifestyle    Physical activity:     Days per week: Not on file     Minutes per session: Not on file    Stress: Not on file   Relationships    Social connections:     Talks on phone: Not on file     Gets together: Not on file     Attends Taoism service: Not on file     Active member of club or organization: Not on file     Attends meetings of clubs or organizations: Not on file     Relationship status: Not on file    Intimate partner violence:     Fear of current or ex partner: Not on file     Emotionally abused: Not on file     Physically abused: Not on file     Forced sexual activity: Not on file   Other Topics Concern    Not on file   Social History Narrative    Not on file         ALLERGIES: Patient has no known allergies. Review of Systems   Constitutional: Negative for chills and fever. Eyes: Negative for visual disturbance. Respiratory: Negative for shortness of breath. Cardiovascular: Negative for chest pain. Gastrointestinal: Positive for nausea (Resolved. ). Negative for abdominal pain, diarrhea and vomiting. Genitourinary: Negative for dysuria and frequency. Musculoskeletal: Positive for back pain and myalgias. Skin: Negative for color change. Neurological: Negative for dizziness, light-headedness and headaches. All other systems reviewed and are negative. Vitals:    09/25/19 1139   BP: (!) 133/92   Pulse: 77   Resp: 20   Temp: 98.6 °F (37 °C)   SpO2: 98%   Weight: 71.7 kg (158 lb)   Height: 5' 4\" (1.626 m)            Physical Exam      Constitutional: Pt is awake and alert. Pt appears well-developed and well-nourished. NAD. HENT:   Head: Normocephalic and atraumatic.    Nose: Nose normal.   Mouth/Throat: Oropharynx is clear and moist. No oropharyngeal exudate. Eyes: Conjunctivae and extraocular motions are normal. Pupils are equal, round, and reactive to light. Right eye exhibits no discharge. Left eye exhibits no discharge. No scleral icterus. Neck: No tracheal deviation present. Supple neck. Cardiovascular: Normal rate, regular rhythm,and intact distal pulses. Pulmonary/Chest: Effort normal   Abdominal: Soft. Pt  exhibits no distension and no mass. No tenderness. Pt  has no rebound and no guarding. Musculoskeletal:  Pt  exhibits no edema. Tenderness to the left paraspinal area near the cervicothoracic junction. Ext: Normal ROM in all four extremities; not tender to palpation; distal pulses are normal, no edema. Neurological:  Pt is alert. nonfocal neuro exam.  Skin: Skin is warm and dry. Pt  is not diaphoretic. Psychiatric:  Pt  has a normal mood and affect. Behavior is normal.       Note written by Guillermina Martin. Angelique Jimenez, as dictated by Blanche Davidson DO 11:42 AM        MDM       Procedures          Refused C spine xrays    2:11 PM - minimal pain  Wants to f/u with chiropractor     likely msk pain  Doubt acs  Dc home    Labs Reviewed   METABOLIC PANEL, BASIC - Abnormal; Notable for the following components:       Result Value    BUN/Creatinine ratio 24 (*)     All other components within normal limits   CBC WITH AUTOMATED DIFF   TROPONIN I   SAMPLES BEING HELD   POC TROPONIN-I         ekg interpretation by Inocente Lima DO  Nsr, regular, rate 67. Normal axis/intervals. No acute sttwa seen.

## 2019-09-25 NOTE — ED TRIAGE NOTES
Pt presents with left upper back pain that started today. Pt reports it almost felt like she swallowed something.

## 2019-09-25 NOTE — DISCHARGE INSTRUCTIONS
Patient Education        Neck Pain: Care Instructions  Your Care Instructions    You can have neck pain anywhere from the bottom of your head to the top of your shoulders. It can spread to the upper back or arms. Injuries, painting a ceiling, sleeping with your neck twisted, staying in one position for too long, and many other activities can cause neck pain. Most neck pain gets better with home care. Your doctor may recommend medicine to relieve pain or relax your muscles. He or she may suggest exercise and physical therapy to increase flexibility and relieve stress. You may need to wear a special (cervical) collar to support your neck for a day or two. Follow-up care is a key part of your treatment and safety. Be sure to make and go to all appointments, and call your doctor if you are having problems. It's also a good idea to know your test results and keep a list of the medicines you take. How can you care for yourself at home? · Try using a heating pad on a low or medium setting for 15 to 20 minutes every 2 or 3 hours. Try a warm shower in place of one session with the heating pad. · You can also try an ice pack for 10 to 15 minutes every 2 to 3 hours. Put a thin cloth between the ice and your skin. · Take pain medicines exactly as directed. ? If the doctor gave you a prescription medicine for pain, take it as prescribed. ? If you are not taking a prescription pain medicine, ask your doctor if you can take an over-the-counter medicine. · If your doctor recommends a cervical collar, wear it exactly as directed. When should you call for help? Call your doctor now or seek immediate medical care if:    · You have new or worsening numbness in your arms, buttocks or legs.     · You have new or worsening weakness in your arms or legs.  (This could make it hard to stand up.)     · You lose control of your bladder or bowels.    Watch closely for changes in your health, and be sure to contact your doctor if:    · Your neck pain is getting worse.     · You are not getting better after 1 week.     · You do not get better as expected. Where can you learn more? Go to http://melva-tyler.info/. Enter [id number] in the search box to learn more about \"Neck Pain: Care Instructions. \"  Current as of: June 26, 2019  Content Version: 12.2  © 6267-2705 [employer]. Care instructions adapted under license by [employer] (which disclaims liability or warranty for this information). If you have questions about a medical condition or this instruction, always ask your healthcare professional. [address] any warranty or liability for your use of this information.

## 2019-12-20 ENCOUNTER — TELEPHONE (OUTPATIENT)
Dept: FAMILY MEDICINE CLINIC | Age: 59
End: 2019-12-20

## 2019-12-20 NOTE — TELEPHONE ENCOUNTER
----- Message from Marcelina Hawk sent at 12/20/2019  8:45 AM EST -----  Regarding: Telephone  General Message/Vendor Calls    Caller's first and last name: Briseyda Singh       Reason for call:Imaging      Callback required yes/no and why: yes      Best contact number(s): [phone]      Details to clarify the request: Pt wanted to know what type of scan she had done about 2 or 3 years ago.       Marcelina Hawk

## 2020-02-23 ENCOUNTER — OFFICE VISIT (OUTPATIENT)
Dept: FAMILY MEDICINE CLINIC | Age: 60
End: 2020-02-23

## 2020-02-23 VITALS
HEART RATE: 73 BPM | BODY MASS INDEX: 27.49 KG/M2 | RESPIRATION RATE: 16 BRPM | OXYGEN SATURATION: 99 % | HEIGHT: 64 IN | WEIGHT: 161 LBS | SYSTOLIC BLOOD PRESSURE: 121 MMHG | DIASTOLIC BLOOD PRESSURE: 74 MMHG | TEMPERATURE: 97.9 F

## 2020-02-23 DIAGNOSIS — R05.9 COUGH: Primary | ICD-10-CM

## 2020-02-23 RX ORDER — GUAIFENESIN 400 MG/1
400 TABLET ORAL
Qty: 90 TAB | Refills: 0 | Status: SHIPPED | OUTPATIENT
Start: 2020-02-23

## 2020-02-23 RX ORDER — ALBUTEROL SULFATE 90 UG/1
2 AEROSOL, METERED RESPIRATORY (INHALATION)
Qty: 1 INHALER | Refills: 5 | Status: SHIPPED | OUTPATIENT
Start: 2020-02-23 | End: 2021-02-17

## 2020-02-23 NOTE — PROGRESS NOTES
Chief Complaint   Patient presents with    Sore Throat     sx for 9 days    Cough     productive cough--taking tussin cough     she is a 62y.o. year old female who presents for evalution. Reviewed PmHx, RxHx, FmHx, SocHx, AllgHx and updated and dated in the chart. Aspirin yes ____   No____ N/A____    There are no active problems to display for this patient. Nurse notes were reviewed and copied and are correct  Review of Systems - negative except as listed above in the HPI    Objective:     Vitals:    02/23/20 0911   BP: 121/74   Pulse: 73   Resp: 16   Temp: 97.9 °F (36.6 °C)   TempSrc: Oral   SpO2: 99%   Weight: 161 lb (73 kg)   Height: 5' 4\" (1.626 m)       Physical Examination: General appearance - alert, well appearing, and in no distress and oriented to person, place, and time  Mental status - alert, oriented to person, place, and time  Ears - bilateral TM's and external ear canals normal  Mouth - mucous membranes moist, pharynx normal without lesions  Neck - supple, no significant adenopathy  Chest - clear to auscultation, no wheezes, rales or rhonchi, symmetric air entry  Heart - normal rate, regular rhythm, normal S1, S2, no murmurs, rubs, clicks or gallops      Assessment/ Plan:   Diagnoses and all orders for this visit:    1. Cough  -     guaiFENesin (ORGANIDIN) 400 mg tablet; Take 1 Tab by mouth every eight (8) hours as needed for Congestion.  -     albuterol (PROVENTIL HFA, VENTOLIN HFA, PROAIR HFA) 90 mcg/actuation inhaler; Take 2 Puffs by inhalation every four (4) hours as needed for Wheezing for up to 360 days. ICD-10-CM ICD-9-CM    1. Cough R05 786.2 guaiFENesin (ORGANIDIN) 400 mg tablet      albuterol (PROVENTIL HFA, VENTOLIN HFA, PROAIR HFA) 90 mcg/actuation inhaler       I have discussed the diagnosis with the patient and the intended plan as seen in the above orders. The patient has received an after-visit summary and questions were answered concerning future plans. Medication Side Effects and Warnings were discussed with patient: yes  Patient Labs were reviewed and or requested: yes  Patient Past Records were reviewed and or requested: yes        There are no Patient Instructions on file for this visit.     The patient verbalizes understanding and agrees with the plan of care        Patient has the advanced directives booklet to review

## 2020-02-23 NOTE — PROGRESS NOTES
Chief Complaint   Patient presents with    Sore Throat     sx for 9 days    Cough     productive cough--taking tussin cough     1. Have you been to the ER, urgent care clinic since your last visit? Hospitalized since your last visit? No    2. Have you seen or consulted any other health care providers outside of the [address] [place] [address] since your last visit? Include any pap smears or colon screening.  No    Also taking katherin seltzer plus--

## 2024-09-30 RX ORDER — OMEPRAZOLE 40 MG/1
CAPSULE, DELAYED RELEASE ORAL
COMMUNITY

## 2024-09-30 RX ORDER — PREDNISONE 50 MG/1
50 TABLET ORAL
COMMUNITY
Start: 2024-06-27

## 2024-09-30 RX ORDER — FAMOTIDINE 40 MG/1
40 TABLET, FILM COATED ORAL
COMMUNITY
Start: 2024-06-27

## 2024-10-15 ENCOUNTER — OFFICE VISIT (OUTPATIENT)
Age: 64
End: 2024-10-15
Payer: COMMERCIAL

## 2024-10-15 VITALS
HEART RATE: 76 BPM | RESPIRATION RATE: 20 BRPM | SYSTOLIC BLOOD PRESSURE: 131 MMHG | BODY MASS INDEX: 27.66 KG/M2 | OXYGEN SATURATION: 98 % | WEIGHT: 162 LBS | HEIGHT: 64 IN | DIASTOLIC BLOOD PRESSURE: 78 MMHG

## 2024-10-15 DIAGNOSIS — R07.9 CHEST PAIN, UNSPECIFIED TYPE: ICD-10-CM

## 2024-10-15 DIAGNOSIS — R07.9 CHEST PAIN, UNSPECIFIED TYPE: Primary | ICD-10-CM

## 2024-10-15 DIAGNOSIS — Z82.3 FAMILY HISTORY OF STROKE: ICD-10-CM

## 2024-10-15 PROCEDURE — 93000 ELECTROCARDIOGRAM COMPLETE: CPT | Performed by: STUDENT IN AN ORGANIZED HEALTH CARE EDUCATION/TRAINING PROGRAM

## 2024-10-15 PROCEDURE — 99203 OFFICE O/P NEW LOW 30 MIN: CPT | Performed by: STUDENT IN AN ORGANIZED HEALTH CARE EDUCATION/TRAINING PROGRAM

## 2024-10-15 PROCEDURE — 1123F ACP DISCUSS/DSCN MKR DOCD: CPT | Performed by: STUDENT IN AN ORGANIZED HEALTH CARE EDUCATION/TRAINING PROGRAM

## 2024-10-15 NOTE — PROGRESS NOTES
[place]  [address] [facility], [address]  [place] [address]    Office [phone],Fax [phone]           Mary Ellen Lubin is a 64 y.o. female presents to the office for chest pain    Assessment/Recommendations:     Diagnosis Orders   1. Chest pain, unspecified type  EKG 12 Lead    Stress Echocardiogram (TTE) exercise (PRN contrast/bubble/strain/3D) order panel    Lipoprotein A (LPA)    Apolipoprotein B-100    Lipid Panel      2. Family history of stroke            Atypical chest pain symptoms.  Recommend to proceed with exercise stress echo.  Advanced lipid testing for further restratification      Primary Care Physician- No primary care provider on file.    Follow-up after completion above testing        Subjective:  64-year-old female presents to the office for follow-up after recent urgent care evaluation.  Patient was evaluated by [facility] emergency department for chest pain symptoms.  Apparently her blood pressure was significantly elevated when she presented for evaluation.  Blood pressure was in the 170s per her report.  At the time she was having some left-sided chest pain symptoms.  Left-sided chest pain symptoms are chronic in nature.  They are not exacerbated by exertion.  No exertional dyspnea.  Family history of stroke.      No past medical history on file.     No past surgical history on file.      Current Outpatient Medications:     Multiple Vitamin (MULTIVITAMINS PO), Take by mouth, Disp: , Rfl:     omeprazole (PRILOSEC) 40 MG delayed release capsule, Take by mouth (Patient not taking: Reported on 10/15/2024), Disp: , Rfl:     famotidine (PEPCID) 40 MG tablet, Take 1 tablet by mouth (Patient not taking: Reported on 10/15/2024), Disp: , Rfl:     predniSONE (DELTASONE) 50 MG tablet, Take 1 tablet by mouth (Patient not taking: Reported on 10/15/2024), Disp: , Rfl:     Allergies   Allergen Reactions    Wasp Venom Anaphylaxis        No family history on

## 2024-10-15 NOTE — PROGRESS NOTES
had concerns including Chest Pain and Follow-Up from Hospital.    Vitals:    10/15/24 0832   BP: 131/78   Site: Left Upper Arm   Position: Sitting   Pulse: 76   Resp: (!) 98   Weight: 73.5 kg (162 lb)   Height: 1.626 m (5' 4\")         Pt C/O  current chest pain LEFT SHOULD PAIN    WAS AT [facility] ER

## 2024-10-24 LAB
APO B SERPL-MCNC: 119 MG/DL
CHOLEST SERPL-MCNC: 252 MG/DL (ref 100–199)
HDLC SERPL-MCNC: 63 MG/DL
LDLC SERPL CALC-MCNC: 166 MG/DL (ref 0–99)
TRIGL SERPL-MCNC: 131 MG/DL (ref 0–149)
VLDLC SERPL CALC-MCNC: 23 MG/DL (ref 5–40)

## 2024-10-25 DIAGNOSIS — E78.5 HYPERLIPIDEMIA: Primary | ICD-10-CM

## 2024-10-25 RX ORDER — ROSUVASTATIN CALCIUM 20 MG/1
20 TABLET, COATED ORAL NIGHTLY
Qty: 90 TABLET | Refills: 3 | Status: SHIPPED | OUTPATIENT
Start: 2024-10-25

## 2024-10-26 LAB
IMP & REVIEW OF LAB RESULTS: NORMAL
LPA SERPL-SCNC: 24.8 NMOL/L

## 2024-12-16 ENCOUNTER — TELEPHONE (OUTPATIENT)
Age: 64
End: 2024-12-16

## 2024-12-16 NOTE — TELEPHONE ENCOUNTER
Patients scheduled stress echocardiogram for tomorrow, 12/17/2024 at 11:00 is pending authorization.     Please call or have Dr. Villafuerte  call the patient to cancel the stress echocardiogram.  Please do not reschedule until we have a final decision.     Prior authorization was sent with office note and EKG from dates of service 10/15/2024.    Thank you,     Jennifer       ################################    Unable to reach pt. Message left detailed message explaining the above information

## 2024-12-16 NOTE — TELEPHONE ENCOUNTER
Patient wants to know if she is still a good candidate for this stress test please follow up with her at [phone]    ##################################    Unable to reach pt. Message left detailed message explaining That unless something major has happened such as being hospitalized nd having other test done, then yes she should still have the stress test done.

## 2024-12-16 NOTE — TELEPHONE ENCOUNTER
Patient wants to know if she is still a good candidate for this stress test please follow up with her at [phone]

## 2024-12-16 NOTE — TELEPHONE ENCOUNTER
Spoke with the pt, identified the pt with name and .  Pt is recovering from a illness last week and was wondering if she should still come in for her stress echo tomorrow. Pt denies fever, says she's getting over whatever it was, and is functioning well, just not 100% herself yet, and doesn't think she could \"run\" on the treadmill.  I explained to her how the stress test is cinthia aiming for a certain percentage of her base HR and will more then likely not reach the running stage, but more of a fast walk.  Pt stated she feels she could definitely do that and plans on coming tomorrow. I asked her to reschedule if she should have N&V or a fever in the morning. Pt agreed.

## 2024-12-16 NOTE — TELEPHONE ENCOUNTER
Patients scheduled stress echocardiogram for tomorrow, 12/17/2024 at 11:00 is pending authorization.     Please call or have Dr. Villafuerte  call the patient to cancel the stress echocardiogram.  Please do not reschedule until we have a final decision.     Prior authorization was sent with office note and EKG from dates of service 10/15/2024.    Thank you,     Jennifer

## 2024-12-17 DIAGNOSIS — R07.9 CHEST PAIN, UNSPECIFIED TYPE: Primary | ICD-10-CM

## 2024-12-17 NOTE — TELEPHONE ENCOUNTER
Appointment as follows:    Future Appointments   Date Time Provider Department Center   1/10/2025 10:00 AM [facility] STRESS ECHO [facility]   1/21/2025  9:20 AM Sandoval Villafuerte DO [facility]

## 2024-12-17 NOTE — TELEPHONE ENCOUNTER
Prior authorization for stress echocardiogram that was scheduled for today, 12/17/2024 at 11:00 has been denied.      I will scan in media the denial information.      Please advise patient once Dr. Villafuerte decides what testing he'd like to move forward with.     Thank you,     Jennifer

## 2025-06-02 ENCOUNTER — OFFICE VISIT (OUTPATIENT)
Age: 65
End: 2025-06-02
Payer: MEDICARE

## 2025-06-02 VITALS
WEIGHT: 165 LBS | RESPIRATION RATE: 16 BRPM | BODY MASS INDEX: 28.17 KG/M2 | DIASTOLIC BLOOD PRESSURE: 70 MMHG | HEART RATE: 79 BPM | SYSTOLIC BLOOD PRESSURE: 120 MMHG | HEIGHT: 64 IN | OXYGEN SATURATION: 97 %

## 2025-06-02 DIAGNOSIS — E78.2 MIXED HYPERLIPIDEMIA: ICD-10-CM

## 2025-06-02 DIAGNOSIS — R07.9 CHEST PAIN, UNSPECIFIED TYPE: Primary | ICD-10-CM

## 2025-06-02 PROCEDURE — 3017F COLORECTAL CA SCREEN DOC REV: CPT | Performed by: STUDENT IN AN ORGANIZED HEALTH CARE EDUCATION/TRAINING PROGRAM

## 2025-06-02 PROCEDURE — 1159F MED LIST DOCD IN RCRD: CPT | Performed by: STUDENT IN AN ORGANIZED HEALTH CARE EDUCATION/TRAINING PROGRAM

## 2025-06-02 PROCEDURE — 1090F PRES/ABSN URINE INCON ASSESS: CPT | Performed by: STUDENT IN AN ORGANIZED HEALTH CARE EDUCATION/TRAINING PROGRAM

## 2025-06-02 PROCEDURE — G8427 DOCREV CUR MEDS BY ELIG CLIN: HCPCS | Performed by: STUDENT IN AN ORGANIZED HEALTH CARE EDUCATION/TRAINING PROGRAM

## 2025-06-02 PROCEDURE — G8419 CALC BMI OUT NRM PARAM NOF/U: HCPCS | Performed by: STUDENT IN AN ORGANIZED HEALTH CARE EDUCATION/TRAINING PROGRAM

## 2025-06-02 PROCEDURE — 99213 OFFICE O/P EST LOW 20 MIN: CPT | Performed by: STUDENT IN AN ORGANIZED HEALTH CARE EDUCATION/TRAINING PROGRAM

## 2025-06-02 PROCEDURE — 1036F TOBACCO NON-USER: CPT | Performed by: STUDENT IN AN ORGANIZED HEALTH CARE EDUCATION/TRAINING PROGRAM

## 2025-06-02 PROCEDURE — 1126F AMNT PAIN NOTED NONE PRSNT: CPT | Performed by: STUDENT IN AN ORGANIZED HEALTH CARE EDUCATION/TRAINING PROGRAM

## 2025-06-02 PROCEDURE — G8399 PT W/DXA RESULTS DOCUMENT: HCPCS | Performed by: STUDENT IN AN ORGANIZED HEALTH CARE EDUCATION/TRAINING PROGRAM

## 2025-06-02 PROCEDURE — 1123F ACP DISCUSS/DSCN MKR DOCD: CPT | Performed by: STUDENT IN AN ORGANIZED HEALTH CARE EDUCATION/TRAINING PROGRAM

## 2025-06-02 RX ORDER — ROSUVASTATIN CALCIUM 20 MG/1
20 TABLET, COATED ORAL NIGHTLY
Qty: 90 TABLET | Refills: 3 | Status: SHIPPED | OUTPATIENT
Start: 2025-06-02

## 2025-06-02 NOTE — PROGRESS NOTES
[place]  [address] [facility], [address]  [place] [address]    Office [phone],Fax [phone]           Mary Ellen Lubin is a 65 y.o. female presents to the office for chest pain    Assessment/Recommendations:     Diagnosis Orders   1. Chest pain, unspecified type        2. Mixed hyperlipidemia  rosuvastatin (CRESTOR) 20 MG tablet            Atypical chest pain symptoms.  Previous stress testing showed excellent functional capacity.  She is very active without any ongoing exertional chest pain or chest pressure symptoms    Hyperlipidemia.  Normal LP(a).  Elevated APO B100 with an LDL greater than 160.  Recommend Crestor 20 mg daily.  Long-term prescription can be filled by primary care      Primary Care Physician- Diana Arevalo MD    Follow-up as needed        Subjective:  Active without any ongoing exertional chest pain or chest pressure symptoms.  Mainly at night she will feel a discomfort in the upper left portion of her chest.  Worried about the side effects of statin, discontinued her Crestor.            Current Outpatient Medications:     rosuvastatin (CRESTOR) 20 MG tablet, Take 1 tablet by mouth nightly, Disp: 90 tablet, Rfl: 3    predniSONE (DELTASONE) 50 MG tablet, Take 1 tablet by mouth, Disp: , Rfl:     Allergies   Allergen Reactions    Wasp Venom Anaphylaxis        No family history on file.    Social History     Tobacco Use    Smoking status: Never    Smokeless tobacco: Never   Substance Use Topics    Alcohol use: Yes     Alcohol/week: 2.0 standard drinks of alcohol     Types: 2 Glasses of wine per week    Drug use: Never       Review of Symptoms:  Pertinent Positive: Nocturnal left-sided chest pressure symptoms  Pertinent Negative: No exertional chest pain, shortness of breath, dyspnea exertion, orthopnea, PND  All Other systems reviewed and are negative for a Comprehensive ROS (10+)    Physical Exam    Vitals:    06/02/25 0929   BP: 120/70

## 2025-06-02 NOTE — PROGRESS NOTES
had concerns including Chest Pain and Follow-up.    Vitals:    06/02/25 0929   BP: 120/70   BP Site: Left Upper Arm   Patient Position: Sitting   Pulse: 79   Resp: 16   SpO2: 97%   Weight: 74.8 kg (165 lb)   Height: 1.626 m (5' 4\")        Chest pain No    Refills No        1. Have you been to the ER, urgent care clinic since your last visit? No       Hospitalized since your last visit? No       Where?        Date?

## 2025-07-07 ENCOUNTER — HOSPITAL ENCOUNTER (OUTPATIENT)
Facility: HOSPITAL | Age: 65
Setting detail: RECURRING SERIES
Discharge: HOME | End: 2025-07-10
Payer: MEDICARE

## 2025-07-07 PROCEDURE — 97162 PT EVAL MOD COMPLEX 30 MIN: CPT

## 2025-07-07 PROCEDURE — 97535 SELF CARE MNGMENT TRAINING: CPT

## 2025-07-07 NOTE — THERAPY EVALUATION
Physical Therapy at [facility],   a part of [facility]  [address] [place] [address], [address]  [place], [place] [address]  Phone: [phone]  Fax: [phone]     PHYSICAL THERAPY - MEDICARE EVALUATION/PLAN OF CARE NOTE (updated 3/23)    Date: 2025        Patient Name:  Mary Ellen Lubin :  1960   Medical   Diagnosis:  Urinary incontinence [R32] Treatment Diagnosis:  M62.89  OTHER SPECIFIED DISORDERS OF MUSCLE and N39.46  MIXED INCONTINENCE    Referral Source:  Henrietta Abrams MD Provider #:  [id number]                Insurance: Payor: MEDICARE / Plan: MEDICARE PART A AND B / Product Type: *No Product type* /      Patient  verified yes     Visit #   Current  / Total 1 12   Time   In / Out 300p 417p   Total Treatment Time 77   Total Timed Codes 40   1:1 Treatment Time 40    [facility] Totals Reminder:  bill using total billable   min of TIMED therapeutic procedures and modalities.   8-22 min = 1 unit; 23-37 min = 2 units; 38-52 min = 3 units;  53-67 min = 4 units; 68-82 min = 5 units     SUBJECTIVE  If an interpreting service was utilized for treatment of this patient, the contents of this document represent the material reviewed with the patient via the .     Pain Level (0-10 scale): 0  []constant []intermittent []improving []worsening []no change since onset    Any medication changes, allergies to medications, adverse drug reactions, diagnosis change, or new procedure performed?: [x] No    [] Yes (see summary sheet for update)  Medications: Verified on Patient Summary List    Subjective functional status/changes:       Had multiple UTIs 2-3 months ago. Saw urogyn in May-  with stage 2 prolapse. Wears panty liners. Has leakage with coughing, laughing, jumping, waiting too long. Doesn't feel heaviness in her pelvis. Doesnt always empty fully.  Drinks 2 cups of coffee then maybe 4 glasses of water daily. Pees more frequently in the morning but can hold up

## 2025-07-15 ENCOUNTER — HOSPITAL ENCOUNTER (OUTPATIENT)
Facility: HOSPITAL | Age: 65
Setting detail: RECURRING SERIES
Discharge: HOME | End: 2025-07-18
Payer: MEDICARE

## 2025-07-15 PROCEDURE — 97110 THERAPEUTIC EXERCISES: CPT

## 2025-07-15 PROCEDURE — 97112 NEUROMUSCULAR REEDUCATION: CPT

## 2025-07-15 PROCEDURE — 97535 SELF CARE MNGMENT TRAINING: CPT

## 2025-07-15 NOTE — PROGRESS NOTES
PHYSICAL THERAPY - MEDICARE DAILY TREATMENT NOTE (updated 3/23)    Date: 7/15/2025        Patient Name:  Mary Ellen Lubin :  1960   Medical   Diagnosis:  Urinary incontinence [R32] Treatment Diagnosis:  M62.89  OTHER SPECIFIED DISORDERS OF MUSCLE and N39.46  MIXED INCONTINENCE    Referral Source:  Henrietta Abrams MD Insurance:   Payor: MEDICARE / Plan: MEDICARE PART A AND B / Product Type: *No Product type* /                  Patient  verified yes     Visit #   Current  / Total 2 12   Time   In / Out 500p 544p   Total Treatment Time 44   Total Timed Codes 44   1:1 Treatment Time 44      [facility] Totals Reminder:  bill using total billable   min of TIMED therapeutic procedures and modalities.   8-22 min = 1 unit; 23-37 min = 2 units; 38-52 min = 3 units; 53-67 min = 4 units; 68-82 min = 5 units        SUBJECTIVE  If an interpreting service was utilized for treatment of this patient, the contents of this document represent the material reviewed with the patient via the .     Pain Level (0-10 scale): 0    Any medication changes, allergies to medications, adverse drug reactions, diagnosis change, or new procedure performed?: [x] No    [] Yes (see summary sheet for update)  Medications: Verified on Patient Summary List    Subjective functional status/changes:       Trying to focus on fiber and massage. No changes in BMs. Trying to limit JIC voids. Forgot about breathing with lifting.     OBJECTIVE    Therapeutic Procedures:  Tx Min Billable or 1:1 Min (if diff from Tx Min) Procedure, Rationale, Specifics   10  [id number] Self Care/Home Management (timed):  improve patient knowledge and understanding of home injury/symptom/pain management, positioning, posture/ergonomics, and activity modification  to improve patient's ability to progress to PLOF and address remaining functional goals.  (see flow sheet as applicable)     Details if applicable: pressure management, bowel massage   20  [id number]

## 2025-08-05 ENCOUNTER — APPOINTMENT (OUTPATIENT)
Facility: HOSPITAL | Age: 65
End: 2025-08-05
Payer: MEDICARE

## 2025-08-12 ENCOUNTER — HOSPITAL ENCOUNTER (OUTPATIENT)
Facility: HOSPITAL | Age: 65
Setting detail: RECURRING SERIES
Discharge: HOME | End: 2025-08-15
Payer: MEDICARE

## 2025-08-12 PROCEDURE — 97112 NEUROMUSCULAR REEDUCATION: CPT

## 2025-08-12 PROCEDURE — 97535 SELF CARE MNGMENT TRAINING: CPT

## 2025-08-18 ENCOUNTER — HOSPITAL ENCOUNTER (OUTPATIENT)
Facility: HOSPITAL | Age: 65
Setting detail: RECURRING SERIES
Discharge: HOME | End: 2025-08-21
Payer: MEDICARE

## 2025-08-18 PROCEDURE — 97112 NEUROMUSCULAR REEDUCATION: CPT

## 2025-08-18 PROCEDURE — 97535 SELF CARE MNGMENT TRAINING: CPT

## 2025-08-25 ENCOUNTER — APPOINTMENT (OUTPATIENT)
Facility: HOSPITAL | Age: 65
End: 2025-08-25
Payer: MEDICARE